# Patient Record
Sex: MALE | Race: WHITE | Employment: FULL TIME | URBAN - METROPOLITAN AREA
[De-identification: names, ages, dates, MRNs, and addresses within clinical notes are randomized per-mention and may not be internally consistent; named-entity substitution may affect disease eponyms.]

---

## 2018-01-12 ENCOUNTER — HOSPITAL ENCOUNTER (OUTPATIENT)
Dept: PHYSICAL THERAPY | Age: 38
Discharge: HOME | End: 2018-01-12
Payer: COMMERCIAL

## 2018-01-12 PROCEDURE — 97110 THERAPEUTIC EXERCISES: CPT

## 2018-01-12 PROCEDURE — 97161 PT EVAL LOW COMPLEX 20 MIN: CPT

## 2018-01-12 NOTE — THERAPY EVALUATION
Miki Easton  : 1980  Primary: Eron Lucas Rpn  Secondary:  [address] at [facility]  [address], [address], [place]  [phone]   [phone]         OUTPATIENT PHYSICAL THERAPY:Initial Assessment 2018    ICD-10: Treatment Diagnosis: STRAIN OF QUADRICEPS MUSCLE, FASCIA AND TENDON, SEQUELA; S76.112S  Precautions/Allergies: non reported  Fall Risk Score: 1 (? 5 = High Risk)  MD Orders: evaluate and treat MEDICAL/REFERRING DIAGNOSIS:Iliotibial band syndrome, left leg [M76.32]  DATE OF ONSET: 2017  REFERRING PHYSICIAN:Miguel Angel Regan MD  RETURN PHYSICIAN APPOINTMENT: did not specify     INITIAL ASSESSMENT:  Mr. Leslie Jolly presents to physical therapy with symptoms of lateral knee pain. The examination reveals muscle tightness in his soleus, lateral quad/IT band and poor single leg squat pattern. The examination is of low complexity due to a stable clinical presentation. Skilled physical therapy is recommended to progress the plan of care in order for the patient to return to full function with minimal symptoms. PROBLEM LIST (Impacting functional limitations):  1. Decreased Strength  2. Decreased ADL/Functional Activities  3. Increased Pain  4. Decreased Activity Tolerance  5. Decreased Flexibility/Joint Mobility  6. Decreased [place] with Home Exercise Program INTERVENTIONS PLANNED:  1. Cold  2. Heat  3. Home Exercise Program (HEP)  4. Manual Therapy  5. Range of Motion (ROM)  6. Therapeutic Exercise/Strengthening     TREATMENT PLAN:  Effective Dates: 18 TO 3-12-18. Frequency/Duration: 1 time a week for 8 weeks  GOALS: (Goals have been discussed and agreed upon with patient.)  SHORT-TERM FUNCTIONAL GOALS: Time Frame: 2-4 wks  1. Patient will report 25-50% reduction in overall symptoms  2. Patient will be compliant with HEP and plan of care  3.   Patient will be able to demonstrate 10 single leg squats with good form and no pain  DISCHARGE GOALS: Time Frame: 6-8 wk  1. Patient will report % reduction in overall symptoms in order to be able to have full function with decreased symptoms  2. Patient will be able to run 3-5 miles without increase in symptoms   3. Patient will run with good form as judged by therapist in order to minimize ground reaction forces. Rehabilitation Potential For Stated Goals: Good    Regarding Kaia Son therapy, I certify that the treatment plan above will be carried out by a therapist or under their direction. Thank you for this referral,  Todd Estrella PT,DPT            HISTORY:   History of Present Injury/Illness (Reason for Referral): Reports that he ran a marathon in October but while training for it he felt increased pain in the outside of his knee. [place] seem to aggrevate his symptoms the most.  Currently running 2-3x/wk 3 -5 miles. [employer] does not bother him as much so he has continued to play. Currently runs in [employer] and armenta glycerin. The pain usually lasts several hours.  No swelling or tingiling reported   Past Medical History/Comorbidities: none reported   Social History/Living Environment:enjoys running and cycling    Prior Level of Function/Work/Activity:architict   Current Medications:   none  Date Last Reviewed:  1/15/2018   Number of Personal Factors/Comorbidities that affect the Plan of Care: 0: LOW COMPLEXITY   EXAMINATION:   (Abbreviations: P-pain, NP- no pain, wnl-within normal limits)  Observation/Orthostatic Postural Assessment:    Relaxed standing posture:  · Slight  Genu varus noted, calcaneal varus   · Symmetrical pelvis    Palpation/tone/tissue texture:    ASIS: symmetrical   PSIS: symmetrical   Leg Length: supine:symmetrical          Long Sitting: symmetrical       Soft tissue:  · Hip flexors: mild tightness bilateral   · Hamstrings:mild tightness bilateral   · IT band: mild tightness noted on L  · gastroc/soleus/posterior tibialis: increased soleus tightness bilateral         Foot Exam Date:  1-12-18       Left Right   Talocrural Joint: Increased tightness noted Mild tightness noted    Rear foot: (neutral to relaxed: 4-6                      deg-normal) wnl wnl   Mid-foot (navicular drop, <7-normal) wnl wnl   Forefoot: (position, mobility) Varus, mobile Varus,mobile   First ray position/hallux limitus test: PF, mobile PF, mobile   Windlass test: n/a n/a   Foot intrinsic strength:  n/a n/a         ROM:   Multisegmental ROM Date:  1-12-18       Flexion n/a   Extension -   Rotation L -   Rotation R -      Hip ROM Date:  1-12-18       Right Left   Flexion wnl wnl   Extension Slight limitation  Slight limitation    IR wnl wnl   ER wnl wnl           Strength:     Hip Strength Date:  1-12-18       Right Left   Flexion 5 5   Extension 5 5   IR 5 5   ER 5 5   Abduction 5 5      Knee Strength Date:  1-12-18       Right Left   Flexion 5 5   Extension 5 5               Special Tests:   Stability tests:  Lachmans: (-)  Slocums:  AM: (-)          AL: (-)  Posterior drawer: (-)  Posterior Sag: (-)    Varus: 0 deg: L: (+) not consistent   30 deg: L  (+)not consistent  Valgus: 0 deg: (-)   30 deg:(-)    Meniscus cluster test  1. End range flexion: (-)  2. End range extension: (-)  3. Joint line tenderness: mild on L   4. Report of locking: (-)  5. McMurreys: (-)    Neurological Screen:   Myotomes: strong in bilateral LE's     Dermatomes: intact in bilateral LE's         Reflexes: n/a         Neural Tension Tests: SLR (-)  Functional Mobility:    · Double leg squat: unable to maintain form due to tight soleus/TC joint  · Single leg squat:  L:increased valgus         R:increased valgus  · Gait Pattern:  Balance:  Single leg   L: 10 sec, minimal sway  R: 10 sec minimal sway            Body Structures Involved:  1. Joints  2. Muscles  3. Ligaments Body Functions Affected:  1. Sensory/Pain  2. Neuromusculoskeletal  3.  Movement Related Activities and Participation Affected:  1. General Tasks and Demands  2. running   Number of elements that affect the Plan of Care: 1-2: LOW COMPLEXITY   CLINICAL PRESENTATION:   Presentation: Stable and uncomplicated: LOW COMPLEXITY   CLINICAL DECISION MAKING:   Outcome Measure: Tool Used: Lower Extremity Functional Scale (LEFS)  Score:  Initial: 77/80 Most Recent: X/80 (Date: -- )   Interpretation of Score: 20 questions each scored on a 5 point scale with 0 representing \"extreme difficulty or unable to perform\" and 4 representing \"no difficulty\". The lower the score, the greater the functional disability. 80/80 represents no disability. Minimal detectable change is 9 points. Score 80 79-63 62-48 47-32 31-16 15-1 0   Modifier CH CI CJ CK CL CM CN     Medical Necessity:   · Patient is expected to demonstrate progress in strength, range of motion and symptom levels to return to full function. Reason for Services/Other Comments:  · Patient continues to require skilled intervention due to ongoing symptoms. Use of outcome tool(s) and clinical judgement create a POC that gives a: Clear prediction of patient's progress: LOW COMPLEXITY   TREATMENT:   (In addition to Assessment/Re-Assessment sessions the following treatments were rendered)    Subjective Pre-Treatment Symptoms: Reports that every time he runs, he starts to get lateral knee pain    Therapeutic Exercise: (15 min) Done in order to improve strength, ROM and understanding of current condition. Date:  1-12-18   Activity/Exercise Parameters   Education Discussed examination findings, HEP, plan of care   IT band stretch Cross leg, 20 sec   Soleus stretch Standing, 20 sec   Sl squat 10x, knee over 1st-2nd toe,    Manual Therapy: (-) Done in order to improve joint and soft tissue mobility,reduce muscle guarding, and decrease muscle tone    Modalities: (-) Done in order to reduce swelling and pain    Treatment/Session Assessment:  Patient tolerated the session well.  His HEP and plan of care were discussed. We will perform a running analysis next visit  · Pain: Initial:    9/10 at worst, 5/10 after exercise 0/10 at rest Post Session:  0/10 ·   Compliance with Program/Exercises: Will assess as treatment progresses. · Recommendations/Intent for next treatment session: \"Next visit will focus on progressing the patients plan of care\".     Future Appointments  Date Time Provider Stephanie Andre   1/19/2018 3:30 PM Shakira Shah, PT, DPT [facility]     Total Treatment Duration:15 min, evaluation 45 min  PT Patient Time In/Time Out  Time In: 1400  Time Out: 8878 [facility] PT, DPT

## 2018-01-15 NOTE — PROGRESS NOTES
Ambulatory/Rehab Services H2 Model Falls Risk Assessment    Risk Factor Pts. ·   Confusion/Disorientation/Impulsivity  []    4 ·   Symptomatic Depression  []   2 ·   Altered Elimination  []   1 ·   Dizziness/Vertigo  []   1 ·   Gender (Male)  [x]   1 ·   Any administered antiepileptics (anticonvulsants):  []   2 ·   Any administered benzodiazepines:  []   1 ·   Visual Impairment (specify):  []   1 ·   Portable Oxygen Use  []   1 ·   Orthostatic ? BP  []   1 ·   History of Recent Falls (within 3 mos.)  []   5     Ability to Rise from Chair (choose one) Pts. ·   Ability to rise in a single movement  [x]   0 ·   Pushes up, successful in one attempt  []   1 ·   Multiple attempts, but successful  []   3 ·   Unable to rise without assistance  []   4   Total: (5 or greater = High Risk) 1     Falls Prevention Plan:   []                Physical Limitations to Exercise (specify):   []                Mobility Assistance Device (type):   []                Exercise/Equipment Adaptation (specify):    ©2010 [facility] of [address] Patent #[id number].  Federal Law prohibits the replication, distribution or use without written permission from [facility] [employer]

## 2018-01-19 ENCOUNTER — HOSPITAL ENCOUNTER (OUTPATIENT)
Dept: PHYSICAL THERAPY | Age: 38
Discharge: HOME | End: 2018-01-19
Payer: COMMERCIAL

## 2018-01-19 PROCEDURE — 97110 THERAPEUTIC EXERCISES: CPT

## 2018-01-19 NOTE — THERAPY EVALUATION
Mandeep Jolley  : 1980  Primary: Annabella Lucas Rpn  Secondary:  [address] at [facility]  [address] , [address], [place] [address]  [phone]   [phone]         OUTPATIENT PHYSICAL THERAPY:Daily Note 2018    ICD-10: Treatment Diagnosis: STRAIN OF QUADRICEPS MUSCLE, FASCIA AND TENDON, SEQUELA; S76.112S  Precautions/Allergies: non reported  Fall Risk Score: 1 (? 5 = High Risk)  MD Orders: evaluate and treat MEDICAL/REFERRING DIAGNOSIS:Iliotibial band syndrome, left leg [M76.32]  DATE OF ONSET: 2017  REFERRING PHYSICIAN:Juliet Regan MD  RETURN PHYSICIAN APPOINTMENT: did not specify     INITIAL ASSESSMENT:  Mr. Niharika Ravi presents to physical therapy with symptoms of lateral knee pain. The examination reveals muscle tightness in his soleus, lateral quad/IT band and poor single leg squat pattern. The examination is of low complexity due to a stable clinical presentation. Skilled physical therapy is recommended to progress the plan of care in order for the patient to return to full function with minimal symptoms. PROBLEM LIST (Impacting functional limitations):  1. Decreased Strength  2. Decreased ADL/Functional Activities  3. Increased Pain  4. Decreased Activity Tolerance  5. Decreased Flexibility/Joint Mobility  6. Decreased [place] with Home Exercise Program INTERVENTIONS PLANNED:  1. Cold  2. Heat  3. Home Exercise Program (HEP)  4. Manual Therapy  5. Range of Motion (ROM)  6. Therapeutic Exercise/Strengthening     TREATMENT PLAN:  Effective Dates: 18 TO 3-12-18. Frequency/Duration: 1 time a week for 8 weeks  GOALS: (Goals have been discussed and agreed upon with patient.)  SHORT-TERM FUNCTIONAL GOALS: Time Frame: 2-4 wks  1. Patient will report 25-50% reduction in overall symptoms  2. Patient will be compliant with HEP and plan of care  3.   Patient will be able to demonstrate 10 single leg squats with good form and no pain  DISCHARGE GOALS: Time Frame: 6-8 wk  1. Patient will report % reduction in overall symptoms in order to be able to have full function with decreased symptoms  2. Patient will be able to run 3-5 miles without increase in symptoms   3. Patient will run with good form as judged by therapist in order to minimize ground reaction forces. Rehabilitation Potential For Stated Goals: Good    Regarding Phan Dawn therapy, I certify that the treatment plan above will be carried out by a therapist or under their direction. Thank you for this referral,  Rosales Jimenez PT,DPT            HISTORY:   History of Present Injury/Illness (Reason for Referral): Reports that he ran a marathon in October but while training for it he felt increased pain in the outside of his knee. [place] seem to aggrevate his symptoms the most.  Currently running 2-3x/wk 3 -5 miles. [employer] does not bother him as much so he has continued to play. Currently runs in [employer] and armenta glycerin. The pain usually lasts several hours. No swelling or tingiling reported   Past Medical History/Comorbidities: none reported   Social History/Living Environment:enjoys running and cycling    Prior Level of Function/Work/Activity:architict   Current Medications:   none  Date Last Reviewed:  1/22/2018   Number of Personal Factors/Comorbidities that affect the Plan of Care: 0: LOW COMPLEXITY   EXAMINATION:   (Abbreviations: P-pain, NP- no pain, wnl-within normal limits)  Observation/Orthostatic Postural Assessment:    Relaxed standing posture:  · Slight  Genu varus noted, calcaneal varus   · Symmetrical pelvis    Running analysis: [employer] software was used to capture sagittal distal, frontal posterior close up and frontal posterior distal video's  · Sagittal: R: HS, 10 deg knee flex, .11 from COM, 18 deg hip extension, increased anterior tilt                      L: HS, 3 deg knee flx.  .11 from COM, anterior pelvic tilt   · Frontal posterior distal (normal pelvic drop: 5-7 deg): R:  10 deg                     L: 12 deg  · Frontal posterior close up  (normal:w/ shoes 10 deg, without 12-15 deg): R:foot rotated out, difficult to assess accurate pronation angle      L: foot rotated out, difficult to assess accurate pronation angle    Palpation/tone/tissue texture:    ASIS: symmetrical   PSIS: symmetrical   Leg Length: supine:symmetrical          Long Sitting: symmetrical       Soft tissue:  · Hip flexors: mild tightness bilateral   · Hamstrings:mild tightness bilateral   · IT band: mild tightness noted on L  · gastroc/soleus/posterior tibialis: increased soleus tightness bilateral         Foot Exam Date:  1-12-18       Left Right   Talocrural Joint: Increased tightness noted Mild tightness noted    Rear foot: (neutral to relaxed: 4-6                      deg-normal) wnl wnl   Mid-foot (navicular drop, <7-normal) wnl wnl   Forefoot: (position, mobility) Varus, mobile Varus,mobile   First ray position/hallux limitus test: PF, mobile PF, mobile   Windlass test: n/a n/a   Foot intrinsic strength:  n/a n/a         ROM:   Multisegmental ROM Date:  1-12-18       Flexion n/a   Extension -   Rotation L -   Rotation R -      Hip ROM Date:  1-12-18       Right Left   Flexion wnl wnl   Extension Slight limitation  Slight limitation    IR wnl wnl   ER wnl wnl           Strength:     Hip Strength Date:  1-12-18       Right Left   Flexion 5 5   Extension 5 5   IR 5 5   ER 5 5   Abduction 5 5      Knee Strength Date:  1-12-18       Right Left   Flexion 5 5   Extension 5 5               Special Tests:   Stability tests:  Lachmans: (-)  Slocums:  AM: (-)          AL: (-)  Posterior drawer: (-)  Posterior Sag: (-)    Varus: 0 deg: L: (+) not consistent   30 deg: L  (+)not consistent  Valgus: 0 deg: (-)   30 deg:(-)    Meniscus cluster test  4. End range flexion: (-)  5. End range extension: (-)  6. Joint line tenderness: mild on L   7. Report of locking: (-)  8.  Ajit: (-)    Neurological Screen:   Myotomes: strong in bilateral LE's     Dermatomes: intact in bilateral LE's         Reflexes: n/a         Neural Tension Tests: SLR (-)  Functional Mobility:    · Double leg squat: unable to maintain form due to tight soleus/TC joint  · Single leg squat:  L:increased valgus         R:increased valgus  · Gait Pattern:  Balance:  Single leg   L: 10 sec, minimal sway  R: 10 sec minimal sway            CLINICAL DECISION MAKING:   Outcome Measure: Tool Used: Lower Extremity Functional Scale (LEFS)  Score:  Initial: 77/80 Most Recent: X/80 (Date: -- )   Interpretation of Score: 20 questions each scored on a 5 point scale with 0 representing \"extreme difficulty or unable to perform\" and 4 representing \"no difficulty\". The lower the score, the greater the functional disability. 80/80 represents no disability. Minimal detectable change is 9 points. Score 80 79-63 62-48 47-32 31-16 15-1 0   Modifier CH CI CJ CK CL CM CN     Medical Necessity:   · Patient is expected to demonstrate progress in strength, range of motion and symptom levels to return to full function. Reason for Services/Other Comments:  · Patient continues to require skilled intervention due to ongoing symptoms. Use of outcome tool(s) and clinical judgement create a POC that gives a: Clear prediction of patient's progress: LOW COMPLEXITY   TREATMENT:   (In addition to Assessment/Re-Assessment sessions the following treatments were rendered)    Subjective Pre-Treatment Symptoms: Reports that his symptoms are about the same. No pain at rest.     Therapeutic Exercise: (50 min) Done in order to improve strength, ROM and understanding of current condition. Date:  1-12-18 Date  1-22-18   Activity/Exercise Parameters    Education Discussed examination findings, HEP, plan of care Discussed running analysis    Running analysis   Analyzed running mechanics utilizing [employer] software, sagittal and frontal planes were captured. Discussed running analysis afterwards   IT band stretch Cross leg, 20 sec discussed   Soleus stretch Standing, 20 sec Standing, 20 sec   SL squat 10x, knee over 1st-2nd toe,  10x   Manual Therapy: (-) Done in order to improve joint and soft tissue mobility,reduce muscle guarding, and decrease muscle tone    Modalities: (-) Done in order to reduce swelling and pain    Treatment/Session Assessment:  Patient tolerated the session well. His running analysis revealed decreased knee flexion at initial contact, moderate anterior pelvic tilt and decreased frontal plane stability at the pelvis. Discussed trying to run lower to the ground to increase knee flexion and continue working on single leg stability. We will work on IT band mobility next visit. · Pain: Initial:    0/10 Post Session:  2/10 ·   Compliance with Program/Exercises: Will assess as treatment progresses. · Recommendations/Intent for next treatment session: \"Next visit will focus on progressing the patients plan of care\". No future appointments.   Total Treatment Duration:60 min  PT Patient Time In/Time Out  Time In: 1530  Time Out: 3000 [facility] PT, DPT

## 2018-03-23 NOTE — THERAPY EVALUATION
Yahaira Mccullough  : 1980  Primary: Opal Lucas Rpn  Secondary:  [address] at [facility]  [address] , [address], [place] [address]  [phone]   [phone]         OUTPATIENT PHYSICAL THERAPY:Discontinuation Summary 2018    ICD-10: Treatment Diagnosis: STRAIN OF QUADRICEPS MUSCLE, FASCIA AND TENDON, SEQUELA; J71.611G  Precautions/Allergies: non reported  Fall Risk Score: 1 (? 5 = High Risk)  MD Orders: evaluate and treat MEDICAL/REFERRING DIAGNOSIS:Iliotibial band syndrome, left leg [M76.32]  DATE OF ONSET: 2017  REFERRING PHYSICIAN:Juana Regan MD  RETURN PHYSICIAN APPOINTMENT: did not specify     ASSESSMENT:  Yahaira Mccullough has been seen in physical therapy from 18 to 18 for 2 visits. Treatment has been discontinued at this time due to patient feeling like he can continue the plan of care at home. He will be contacted to make sure he is progressing.    Thank you for this referral.         Soila Austin PT,DPT,OCS